# Patient Record
Sex: FEMALE | Race: WHITE | NOT HISPANIC OR LATINO | URBAN - METROPOLITAN AREA
[De-identification: names, ages, dates, MRNs, and addresses within clinical notes are randomized per-mention and may not be internally consistent; named-entity substitution may affect disease eponyms.]

---

## 2022-01-24 ENCOUNTER — TELEPHONE (OUTPATIENT)
Dept: OBSTETRICS AND GYNECOLOGY | Facility: CLINIC | Age: 47
End: 2022-01-24
Payer: COMMERCIAL

## 2022-01-24 NOTE — TELEPHONE ENCOUNTER
----- Message from Kylie Parson sent at 1/24/2022 12:31 PM CST -----  Type:  Sooner Apoointment Request    Caller is requesting a sooner appointment.  Caller declined first available appointment listed below.  Caller will not accept being placed on the waitlist and is requesting a message be sent to doctor.    Name of Caller:  pt  When is the first available appointment?   Symptoms:  unable to schedule  Best Call Back Number:  [phone]  Additional Information:  Annual appt and possible menopause NP

## 2022-02-10 ENCOUNTER — OFFICE VISIT (OUTPATIENT)
Dept: OBSTETRICS AND GYNECOLOGY | Facility: CLINIC | Age: 47
End: 2022-02-10
Payer: COMMERCIAL

## 2022-02-10 VITALS — WEIGHT: 212.5 LBS | DIASTOLIC BLOOD PRESSURE: 76 MMHG | SYSTOLIC BLOOD PRESSURE: 106 MMHG

## 2022-02-10 DIAGNOSIS — N91.2 AMENORRHEA: Primary | ICD-10-CM

## 2022-02-10 DIAGNOSIS — R23.2 HOT FLASHES: ICD-10-CM

## 2022-02-10 PROCEDURE — 99203 PR OFFICE/OUTPT VISIT, NEW, LEVL III, 30-44 MIN: ICD-10-PCS | Mod: S$GLB,,, | Performed by: OBSTETRICS & GYNECOLOGY

## 2022-02-10 PROCEDURE — 1159F MED LIST DOCD IN RCRD: CPT | Mod: CPTII,S$GLB,, | Performed by: OBSTETRICS & GYNECOLOGY

## 2022-02-10 PROCEDURE — 99999 PR PBB SHADOW E&M-EST. PATIENT-LVL IV: CPT | Mod: PBBFAC,,, | Performed by: OBSTETRICS & GYNECOLOGY

## 2022-02-10 PROCEDURE — 3074F PR MOST RECENT SYSTOLIC BLOOD PRESSURE < 130 MM HG: ICD-10-PCS | Mod: CPTII,S$GLB,, | Performed by: OBSTETRICS & GYNECOLOGY

## 2022-02-10 PROCEDURE — 99999 PR PBB SHADOW E&M-EST. PATIENT-LVL IV: ICD-10-PCS | Mod: PBBFAC,,, | Performed by: OBSTETRICS & GYNECOLOGY

## 2022-02-10 PROCEDURE — 3078F DIAST BP <80 MM HG: CPT | Mod: CPTII,S$GLB,, | Performed by: OBSTETRICS & GYNECOLOGY

## 2022-02-10 PROCEDURE — 1160F PR REVIEW ALL MEDS BY PRESCRIBER/CLIN PHARMACIST DOCUMENTED: ICD-10-PCS | Mod: CPTII,S$GLB,, | Performed by: OBSTETRICS & GYNECOLOGY

## 2022-02-10 PROCEDURE — 1159F PR MEDICATION LIST DOCUMENTED IN MEDICAL RECORD: ICD-10-PCS | Mod: CPTII,S$GLB,, | Performed by: OBSTETRICS & GYNECOLOGY

## 2022-02-10 PROCEDURE — 1160F RVW MEDS BY RX/DR IN RCRD: CPT | Mod: CPTII,S$GLB,, | Performed by: OBSTETRICS & GYNECOLOGY

## 2022-02-10 PROCEDURE — 99203 OFFICE O/P NEW LOW 30 MIN: CPT | Mod: S$GLB,,, | Performed by: OBSTETRICS & GYNECOLOGY

## 2022-02-10 PROCEDURE — 3078F PR MOST RECENT DIASTOLIC BLOOD PRESSURE < 80 MM HG: ICD-10-PCS | Mod: CPTII,S$GLB,, | Performed by: OBSTETRICS & GYNECOLOGY

## 2022-02-10 PROCEDURE — 3074F SYST BP LT 130 MM HG: CPT | Mod: CPTII,S$GLB,, | Performed by: OBSTETRICS & GYNECOLOGY

## 2022-02-10 RX ORDER — PROPRANOLOL HYDROCHLORIDE 40 MG/1
TABLET ORAL
COMMUNITY
Start: 2021-12-03 | End: 2024-02-27

## 2022-02-10 RX ORDER — AZELASTINE 1 MG/ML
SPRAY, METERED NASAL
COMMUNITY
Start: 2021-11-30 | End: 2024-02-27

## 2022-02-10 RX ORDER — ERGOCALCIFEROL 1.25 MG/1
CAPSULE ORAL
COMMUNITY
Start: 2021-12-24 | End: 2024-02-27

## 2022-02-10 RX ORDER — NORETHINDRONE 0.35 MG/1
TABLET ORAL
COMMUNITY
Start: 2021-08-25 | End: 2022-02-10 | Stop reason: SDUPTHER

## 2022-02-10 RX ORDER — SUMATRIPTAN SUCCINATE 100 MG/1
100 TABLET ORAL 2 TIMES DAILY PRN
COMMUNITY
Start: 2022-02-01

## 2022-02-10 RX ORDER — NORETHINDRONE 0.35 MG/1
1 TABLET ORAL DAILY
Qty: 28 TABLET | Refills: 11 | Status: SHIPPED | OUTPATIENT
Start: 2022-02-10 | End: 2023-01-24

## 2022-02-10 RX ORDER — ESZOPICLONE 2 MG/1
2 TABLET, FILM COATED ORAL NIGHTLY
COMMUNITY
Start: 2022-01-24 | End: 2024-02-27

## 2022-02-10 RX ORDER — LEVOTHYROXINE SODIUM 88 UG/1
88 TABLET ORAL DAILY
COMMUNITY
Start: 2022-02-01 | End: 2024-02-27

## 2022-02-10 NOTE — PROGRESS NOTES
Chief Complaint   Patient presents with    Well Woman     Pt wants hormone levels checked.        History of Present Illness: Lila Chavira is a 46 y.o. female that presents today 2/10/2022 for   Chief Complaint   Patient presents with    Well Woman     Pt wants hormone levels checked.      She reports no periods for 3-6 months at a time in the last 2 years.  She reports regular periods prior to this was normal.   She reports on nuvaring then now gerardo but off.   She reports breast pain and headache worse on nuvaring.  She reports that she was taking it out and placing new one without leaving out.  Pap done  Within the year.     Past Medical History:   Diagnosis Date    Abnormal Pap smear of cervix     2006    Hypothyroid        Past Surgical History:   Procedure Laterality Date    BASAL CELL CARCINOMA EXCISION      tonsilectomy         Current Outpatient Medications   Medication Sig Dispense Refill    azelastine (ASTELIN) 137 mcg (0.1 %) nasal spray       ergocalciferol (ERGOCALCIFEROL) 50,000 unit Cap       GERARDO 0.35 mg tablet Take 1 tablet (0.35 mg total) by mouth once daily. 28 tablet 11    eszopiclone (LUNESTA) 2 MG Tab Take 2 mg by mouth nightly.      levothyroxine (SYNTHROID) 88 MCG tablet Take 88 mcg by mouth once daily.      propranoloL (INDERAL) 40 MG tablet       sumatriptan (IMITREX) 100 MG tablet Take 100 mg by mouth 2 (two) times daily as needed.       No current facility-administered medications for this visit.       Review of patient's allergies indicates:   Allergen Reactions    Sulfa (sulfonamide antibiotics)        Family History   Problem Relation Age of Onset    Breast cancer Neg Hx     Ovarian cancer Neg Hx      labor Neg Hx        Social History     Tobacco Use    Smoking status: Never Smoker    Smokeless tobacco: Never Used   Substance Use Topics    Alcohol use: Never    Drug use: Never       OB History    Para Term  AB Living   0 0 0 0 0 0   SAB IAB  Ectopic Multiple Live Births   0 0 0 0 0         /76   Wt 96.4 kg (212 lb 8.4 oz)   LMP 10/16/2021       ASSESSMENT/PLAN:  Amenorrhea  -     TSH; Future; Expected date: 02/10/2022  -     SHANICE 0.35 mg tablet; Take 1 tablet (0.35 mg total) by mouth once daily.  Dispense: 28 tablet; Refill: 11  -     Hemoglobin A1C; Future; Expected date: 02/10/2022  -     Prolactin; Future; Expected date: 02/10/2022  -     US Pelvis Comp with Transvag NON-OB (xpd; Future; Expected date: 02/10/2022    Hot flashes  -     Follicle Stimulating Hormone; Future; Expected date: 02/10/2022  -     Luteinizing Hormone; Future; Expected date: 02/10/2022  -     Estradiol; Future; Expected date: 02/10/2022  -     Progesterone; Future; Expected date: 02/10/2022        30 minutes spent today spent preparing reviewing previous external notes, reviewing previous results, performing medical examination, ordering tests or medications, counseling and documenting.

## 2023-03-16 ENCOUNTER — OFFICE VISIT (OUTPATIENT)
Dept: OBSTETRICS AND GYNECOLOGY | Facility: CLINIC | Age: 48
End: 2023-03-16
Payer: COMMERCIAL

## 2023-03-16 VITALS
WEIGHT: 219.56 LBS | DIASTOLIC BLOOD PRESSURE: 70 MMHG | HEIGHT: 65 IN | BODY MASS INDEX: 36.58 KG/M2 | SYSTOLIC BLOOD PRESSURE: 110 MMHG

## 2023-03-16 DIAGNOSIS — Z01.419 ENCOUNTER FOR WELL WOMAN EXAM WITH ROUTINE GYNECOLOGICAL EXAM: Primary | ICD-10-CM

## 2023-03-16 DIAGNOSIS — N91.2 AMENORRHEA: ICD-10-CM

## 2023-03-16 DIAGNOSIS — Z12.31 ENCOUNTER FOR SCREENING MAMMOGRAM FOR BREAST CANCER: ICD-10-CM

## 2023-03-16 PROCEDURE — 3008F PR BODY MASS INDEX (BMI) DOCUMENTED: ICD-10-PCS | Mod: CPTII,S$GLB,, | Performed by: OBSTETRICS & GYNECOLOGY

## 2023-03-16 PROCEDURE — 3078F PR MOST RECENT DIASTOLIC BLOOD PRESSURE < 80 MM HG: ICD-10-PCS | Mod: CPTII,S$GLB,, | Performed by: OBSTETRICS & GYNECOLOGY

## 2023-03-16 PROCEDURE — 87624 HPV HI-RISK TYP POOLED RSLT: CPT | Performed by: OBSTETRICS & GYNECOLOGY

## 2023-03-16 PROCEDURE — 3074F SYST BP LT 130 MM HG: CPT | Mod: CPTII,S$GLB,, | Performed by: OBSTETRICS & GYNECOLOGY

## 2023-03-16 PROCEDURE — 99999 PR PBB SHADOW E&M-EST. PATIENT-LVL III: CPT | Mod: PBBFAC,,, | Performed by: OBSTETRICS & GYNECOLOGY

## 2023-03-16 PROCEDURE — 99999 PR PBB SHADOW E&M-EST. PATIENT-LVL III: ICD-10-PCS | Mod: PBBFAC,,, | Performed by: OBSTETRICS & GYNECOLOGY

## 2023-03-16 PROCEDURE — 1159F MED LIST DOCD IN RCRD: CPT | Mod: CPTII,S$GLB,, | Performed by: OBSTETRICS & GYNECOLOGY

## 2023-03-16 PROCEDURE — 3078F DIAST BP <80 MM HG: CPT | Mod: CPTII,S$GLB,, | Performed by: OBSTETRICS & GYNECOLOGY

## 2023-03-16 PROCEDURE — 88175 CYTOPATH C/V AUTO FLUID REDO: CPT | Performed by: OBSTETRICS & GYNECOLOGY

## 2023-03-16 PROCEDURE — 3008F BODY MASS INDEX DOCD: CPT | Mod: CPTII,S$GLB,, | Performed by: OBSTETRICS & GYNECOLOGY

## 2023-03-16 PROCEDURE — 99396 PR PREVENTIVE VISIT,EST,40-64: ICD-10-PCS | Mod: S$GLB,,, | Performed by: OBSTETRICS & GYNECOLOGY

## 2023-03-16 PROCEDURE — 1159F PR MEDICATION LIST DOCUMENTED IN MEDICAL RECORD: ICD-10-PCS | Mod: CPTII,S$GLB,, | Performed by: OBSTETRICS & GYNECOLOGY

## 2023-03-16 PROCEDURE — 3074F PR MOST RECENT SYSTOLIC BLOOD PRESSURE < 130 MM HG: ICD-10-PCS | Mod: CPTII,S$GLB,, | Performed by: OBSTETRICS & GYNECOLOGY

## 2023-03-16 PROCEDURE — 99396 PREV VISIT EST AGE 40-64: CPT | Mod: S$GLB,,, | Performed by: OBSTETRICS & GYNECOLOGY

## 2023-03-16 RX ORDER — NORETHINDRONE 0.35 MG/1
1 TABLET ORAL DAILY
Qty: 84 TABLET | Refills: 3 | Status: SHIPPED | OUTPATIENT
Start: 2023-03-16 | End: 2024-02-27

## 2023-03-16 NOTE — PROGRESS NOTES
Chief Complaint   Patient presents with    Well Woman    Mammogram       History of Present Illness: Lila Chavira is a 47 y.o. female that presents today 3/16/2023 for well gyn visit.    Past Medical History:   Diagnosis Date    Abnormal Pap smear of cervix     2006    Hypothyroid        Past Surgical History:   Procedure Laterality Date    BASAL CELL CARCINOMA EXCISION      tonsilectomy         Current Outpatient Medications   Medication Sig Dispense Refill    levothyroxine (SYNTHROID) 88 MCG tablet Take 88 mcg by mouth once daily.      propranoloL (INDERAL) 40 MG tablet       sumatriptan (IMITREX) 100 MG tablet Take 100 mg by mouth 2 (two) times daily as needed.      azelastine (ASTELIN) 137 mcg (0.1 %) nasal spray       ergocalciferol (ERGOCALCIFEROL) 50,000 unit Cap       SHANICE 0.35 mg tablet Take 1 tablet (0.35 mg total) by mouth once daily. 84 tablet 3    eszopiclone (LUNESTA) 2 MG Tab Take 2 mg by mouth nightly.       No current facility-administered medications for this visit.       Review of patient's allergies indicates:   Allergen Reactions    Sulfa (sulfonamide antibiotics)        Family History   Problem Relation Age of Onset    Breast cancer Neg Hx     Ovarian cancer Neg Hx      labor Neg Hx        Social History     Socioeconomic History    Marital status: Single   Tobacco Use    Smoking status: Never    Smokeless tobacco: Never   Substance and Sexual Activity    Alcohol use: Never    Drug use: Never    Sexual activity: Not Currently       OB History    Para Term  AB Living   0 0 0 0 0 0   SAB IAB Ectopic Multiple Live Births   0 0 0 0 0       Review of Symptoms:  GENERAL: Denies weight gain or weight loss. Feeling well overall.   SKIN: Denies rash or lesions.   HEAD: Denies head injury or headache.   NODES: Denies enlarged lymph nodes.   CHEST: Denies chest pain or shortness of breath.   CARDIOVASCULAR: Denies palpitations or left sided chest pain.   ABDOMEN: No abdominal pain,  "constipation, diarrhea, nausea, vomiting or rectal bleeding.   URINARY: No frequency, dysuria, hematuria, or burning on urination.  HEMATOLOGIC: No easy bruisability or excessive bleeding.   MUSCULOSKELETAL: Denies joint pain or swelling.     /70   Ht 5' 5" (1.651 m)   Wt 99.6 kg (219 lb 9.3 oz)   LMP  (LMP Unknown)   Physical Exam:  APPEARANCE: Well nourished, well developed, in no acute distress.  SKIN: Normal skin turgor, no lesions.  NECK: Neck symmetric without masses   RESPIRATORY: Normal respiratory effort with no retractions or use of accessory muscles  CARDIOVASCULAR: Peripheral vascular system with no swelling no varicosities and palpation of pulses normal  LYMPHATIC: No enlargements of the lymph nodes noted in the neck, axillae, or groin  ABDOMEN: Soft. No tenderness or masses. No hepatosplenomegaly. No hernias.  BREASTS: Symmetrical, no skin changes or visible lesions. No palpable masses, nipple discharge or adenopathy bilaterally.  PELVIC: Normal external female genitalia without lesions. Normal hair distribution. Adequate perineal body, normal urethral meatus. Urethra with no masses.  Bladder nontender. Vagina moist and well rugated without lesions or discharge. Cervix pink and without lesions. No significant cystocele or rectocele. Bimanual exam showed uterus normal size, shape, position, mobile and nontender. Adnexa without masses or tenderness. Urethra and bladder normal.   EXTREMITIES: No clubbing cyanosis or edema.    ASSESSMENT/PLAN:  Encounter for well woman exam with routine gynecological exam  -     Liquid-Based Pap Smear, Screening  -     HPV High Risk Genotypes, PCR    Encounter for screening mammogram for breast cancer  -     Mammo Digital Screening Kaela w/ [facility]; Future; Expected date: 03/16/2023    Amenorrhea  -     SHANICE 0.35 mg tablet; Take 1 tablet (0.35 mg total) by mouth once daily.  Dispense: 84 tablet; Refill: 3          Patient was counseled today on Pelvic exams and Pap " Smear guidelines.   We discussed STD screening if at high risk for a STD.  We discussed recommendation for breast cancer screening with mammogram every other year after the age of 40 and annually after the age of 50.    We discussed colon cancer screening when indicated.   Osteoporosis screening discussed when indicated.   She was advised to see her primary care physician for all other health maintenance.     FOLLOW-UP with me for next routine visit.

## 2023-03-22 ENCOUNTER — PATIENT MESSAGE (OUTPATIENT)
Dept: OBSTETRICS AND GYNECOLOGY | Facility: CLINIC | Age: 48
End: 2023-03-22
Payer: COMMERCIAL

## 2023-03-22 ENCOUNTER — TELEPHONE (OUTPATIENT)
Dept: RADIOLOGY | Facility: HOSPITAL | Age: 48
End: 2023-03-22
Payer: COMMERCIAL

## 2023-03-22 LAB
HPV HR 12 DNA SPEC QL NAA+PROBE: NEGATIVE
HPV16 AG SPEC QL: NEGATIVE
HPV18 DNA SPEC QL NAA+PROBE: NEGATIVE

## 2023-03-22 NOTE — TELEPHONE ENCOUNTER
Patient will have a 6 month follow up mammogram at [facility] from mammogram done at [facility].

## 2023-03-23 LAB
FINAL PATHOLOGIC DIAGNOSIS: NORMAL
Lab: NORMAL

## 2023-03-24 ENCOUNTER — TELEPHONE (OUTPATIENT)
Dept: OBSTETRICS AND GYNECOLOGY | Facility: CLINIC | Age: 48
End: 2023-03-24
Payer: COMMERCIAL

## 2023-03-24 NOTE — TELEPHONE ENCOUNTER
Stereostactic biopsy versus 6 months follow-up     She discussed with radiology and desires to wait for 6 months.

## 2023-09-18 ENCOUNTER — PATIENT MESSAGE (OUTPATIENT)
Dept: OBSTETRICS AND GYNECOLOGY | Facility: CLINIC | Age: 48
End: 2023-09-18
Payer: COMMERCIAL

## 2023-09-19 ENCOUNTER — TELEPHONE (OUTPATIENT)
Dept: OBSTETRICS AND GYNECOLOGY | Facility: CLINIC | Age: 48
End: 2023-09-19
Payer: COMMERCIAL

## 2023-09-19 NOTE — TELEPHONE ENCOUNTER
"Due 9/22    Left diagnostic mmg and USG     "Patient will have a 6 month follow up mammogram at [facility] from mammogram done at [facility]."  "

## 2024-02-27 ENCOUNTER — OFFICE VISIT (OUTPATIENT)
Dept: PRIMARY CARE CLINIC | Facility: CLINIC | Age: 49
End: 2024-02-27
Payer: COMMERCIAL

## 2024-02-27 VITALS
TEMPERATURE: 97 F | HEIGHT: 65 IN | HEART RATE: 52 BPM | SYSTOLIC BLOOD PRESSURE: 98 MMHG | DIASTOLIC BLOOD PRESSURE: 64 MMHG | WEIGHT: 215.63 LBS | OXYGEN SATURATION: 100 % | BODY MASS INDEX: 35.93 KG/M2

## 2024-02-27 DIAGNOSIS — R63.5 WEIGHT GAIN: ICD-10-CM

## 2024-02-27 DIAGNOSIS — N95.1 MENOPAUSAL SYMPTOMS: ICD-10-CM

## 2024-02-27 DIAGNOSIS — G90.1 DYSAUTONOMIA: ICD-10-CM

## 2024-02-27 DIAGNOSIS — E21.5 PARATHYROID DISORDER: ICD-10-CM

## 2024-02-27 DIAGNOSIS — E03.9 HYPOTHYROIDISM, UNSPECIFIED TYPE: Primary | ICD-10-CM

## 2024-02-27 DIAGNOSIS — R45.89 ANXIETY ABOUT HEALTH: ICD-10-CM

## 2024-02-27 DIAGNOSIS — E78.2 MIXED HYPERLIPIDEMIA: ICD-10-CM

## 2024-02-27 PROCEDURE — 99999 PR PBB SHADOW E&M-EST. PATIENT-LVL III: CPT | Mod: PBBFAC,,, | Performed by: FAMILY MEDICINE

## 2024-02-27 PROCEDURE — 1159F MED LIST DOCD IN RCRD: CPT | Mod: CPTII,S$GLB,, | Performed by: FAMILY MEDICINE

## 2024-02-27 PROCEDURE — 3008F BODY MASS INDEX DOCD: CPT | Mod: CPTII,S$GLB,, | Performed by: FAMILY MEDICINE

## 2024-02-27 PROCEDURE — 3078F DIAST BP <80 MM HG: CPT | Mod: CPTII,S$GLB,, | Performed by: FAMILY MEDICINE

## 2024-02-27 PROCEDURE — 3074F SYST BP LT 130 MM HG: CPT | Mod: CPTII,S$GLB,, | Performed by: FAMILY MEDICINE

## 2024-02-27 PROCEDURE — 99205 OFFICE O/P NEW HI 60 MIN: CPT | Mod: S$GLB,,, | Performed by: FAMILY MEDICINE

## 2024-02-27 RX ORDER — PROPRANOLOL HYDROCHLORIDE 60 MG/1
60 CAPSULE, EXTENDED RELEASE ORAL DAILY
COMMUNITY
Start: 2023-12-27

## 2024-02-27 NOTE — PROGRESS NOTES
"    [facility]       [address] [place] [address]      [phone]     Tatyana Lubin MD         .      Office Visit  02/27/2024  [id number]      SUBJECTIVE     HPI:  Lila Chavira is a 48 y.o. female presents today in clinic for "Establish Care and Thyroid Problem  ."   Patient is new patient to me here to establish care.  Main concern is with thyroid.  She was diagnosed with hypothyroidism over 20 years ago shortly after being seen by dysautonomia specialists in diagnosed with POTS syndrome.  Her primary care has since then resumed care of her thyroid, but she feels like her levels have been up and down and does not feel like it has been well-controlled.  As of November of last year, she stopped taking all thyroid medications waiting to get into this appointment, and wants to get them looked at again.  Recent thyroid ultrasound done by her dysautonomia specialists, was done a few months ago which showed relatively normal thyroid, but to questionable parathyroid abnormalities.  She does not recall a history of elevated calcium levels.  She does have a history of low vitamin-D but is taking a supplement given to her by her cardiologist that apparently has vitamin-D in it.  She is also concerned about weight.  She is never tried getting pregnant does not have any children.  She has not had a cycle in over a year and a half, and labs do support presence of menopause.  She just simply wants to feel better, but has not started or even discuss hormone replacement therapy with anybody.  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  Her cycles were relatively okay and actually was more normal prior to menopause than it had been previously.  No one ever diagnosed her with PCOS.    Thyroid Problem        ROS   Review of symptoms are negative except as noted.     PAST MEDICAL HISTORY " "    Past Medical History:   Diagnosis Date    Abnormal Pap smear of cervix     2006    Dysautonomia     Hypothyroid     Sleep apnea, unspecified        Past Surgical History:   Procedure Laterality Date    ADENOIDECTOMY      BASAL CELL CARCINOMA EXCISION      REDUCTION OF BOTH BREASTS Bilateral     tonsilectomy      TONSILLECTOMY         Social History     Socioeconomic History    Marital status: Single   Tobacco Use    Smoking status: Never    Smokeless tobacco: Never   Substance and Sexual Activity    Alcohol use: Never    Drug use: Never    Sexual activity: Not Currently       Allergies as of 02/27/2024 - Reviewed 02/27/2024   Allergen Reaction Noted    Sulfa (sulfonamide antibiotics)  02/10/2022       HOME MEDS     Current Outpatient Medications   Medication Instructions    propranoloL (INDERAL [place]) 60 mg, Oral, Daily    sumatriptan (IMITREX) 100 mg, Oral, 2 times daily PRN       The following were updated and reviewed by myself in the chart: medications, past medical history, past surgical history, family history, social history, and allergies.        Objective    OBJECTIVE   BP 98/64   Pulse (!) 52   Temp 97.4 °F (36.3 °C)   Ht 5' 5" (1.651 m)   Wt 97.8 kg (215 lb 9.8 oz)   LMP  (Within Years)   SpO2 100%   BMI 35.88 kg/m²     Wt Readings from Last 2 Encounters:   02/27/24 97.8 kg (215 lb 9.8 oz)   03/16/23 99.6 kg (219 lb 9.3 oz)       BP Readings from Last 2 Encounters:   02/27/24 98/64   03/16/23 110/70          Physical Exam  Vitals and nursing note reviewed.   Constitutional:       Appearance: Normal appearance. She is obese.   HENT:      Head: Normocephalic and atraumatic.      Nose: Nose normal.   Eyes:      Extraocular Movements: Extraocular movements intact.      Conjunctiva/sclera: Conjunctivae normal.      Pupils: Pupils are equal, round, and reactive to light.   Neck:      Comments: Increased neck circumference  Cardiovascular:      Rate and Rhythm: Normal rate and regular rhythm. "   Pulmonary:      Effort: Pulmonary effort is normal.      Breath sounds: Normal breath sounds.   Abdominal:      General: Abdomen is flat.      Palpations: Abdomen is soft.      Tenderness: There is no abdominal tenderness.      Comments: Increased abdominal girth   Musculoskeletal:         General: No swelling or tenderness. Normal range of motion.      Cervical back: Normal range of motion.   Lymphadenopathy:      Cervical: No cervical adenopathy.   Skin:     General: Skin is warm.      Coloration: Skin is pale.      Findings: No lesion or rash.   Neurological:      General: No focal deficit present.      Mental Status: She is alert. Mental status is at baseline.   Psychiatric:         Mood and Affect: Mood normal.         Behavior: Behavior normal.               LABS      LAB RESULTS, IF AVAILABLE, ARE DISCUSSED WITH PATIENT AND POSTED TO PATIENT PORTAL     ASSESSMENT & PLAN     1. Hypothyroidism, unspecified type  -     Cancel: TSH; Future; Expected date: 02/27/2024  -     Cancel: Thyroid Peroxidase Antibody; Future; Expected date: 02/27/2024  -     Cancel: T4, Free; Future; Expected date: 02/27/2024  -     Cancel: T3, Free; Future; Expected date: 02/27/2024  -     Cancel: Anti-Thyroglobulin Antibody; Future; Expected date: 02/27/2024  -     CBC Without Differential; Future; Expected date: 02/27/2024  -     Comprehensive Metabolic Panel; Future; Expected date: 02/27/2024  -     Lipid Panel; Future; Expected date: 02/27/2024  -     DHEA-Sulfate; Future; Expected date: 02/27/2024  -     Estrone; Future; Expected date: 02/27/2024  -     TESTOSTERONE, FREE (DIALYSIS) AND TOTAL, LC/MS/MS; Future; Expected date: 02/27/2024  -     Hemoglobin A1C; Future; Expected date: 02/27/2024  -     Insulin, Random; Future; Expected date: 02/27/2024  -     TSH; Future; Expected date: 02/27/2024  -     Thyroid Peroxidase Antibody; Future; Expected date: 02/27/2024  -     T4, Free; Future; Expected date: 02/27/2024  -     T3, Free;  Future; Expected date: 02/27/2024  -     Anti-Thyroglobulin Antibody; Future; Expected date: 02/27/2024  -     Vitamin D 25-Hydroxy; Future; Expected date: 02/27/2024  -     PTH, Intact; Future; Expected date: 02/27/2024  -     CALCIUM, IONIZED; Future; Expected date: 02/27/2024  Has not taking any medication in three months, she has had some low thyroid symptoms, but overall very mi. we will get full thyroid panel and then reassess need for medication.  2. Dysautonomia  Defer to Dr. Scruggs, currently on beta-blocker  3. BMI 35.0-35.9,adult  -     CBC Without Differential; Future; Expected date: 02/27/2024  -     Comprehensive Metabolic Panel; Future; Expected date: 02/27/2024  -     Lipid Panel; Future; Expected date: 02/27/2024  -     DHEA-Sulfate; Future; Expected date: 02/27/2024  -     Estrone; Future; Expected date: 02/27/2024  -     TESTOSTERONE, FREE (DIALYSIS) AND TOTAL, LC/MS/MS; Future; Expected date: 02/27/2024  -     Hemoglobin A1C; Future; Expected date: 02/27/2024  -     Insulin, Random; Future; Expected date: 02/27/2024  -     TSH; Future; Expected date: 02/27/2024  -     Thyroid Peroxidase Antibody; Future; Expected date: 02/27/2024  -     T4, Free; Future; Expected date: 02/27/2024  -     T3, Free; Future; Expected date: 02/27/2024  -     Anti-Thyroglobulin Antibody; Future; Expected date: 02/27/2024  -     Vitamin D 25-Hydroxy; Future; Expected date: 02/27/2024  -     PTH, Intact; Future; Expected date: 02/27/2024  -     CALCIUM, IONIZED; Future; Expected date: 02/27/2024  We will get labs mainly looking at insulin and thyroid.  4. Weight gain  -     Cancel: CBC Without Differential; Future; Expected date: 02/27/2024  -     Cancel: Comprehensive Metabolic Panel; Future; Expected date: 02/27/2024  -     Cancel: Hemoglobin A1C; Future; Expected date: 02/27/2024  -     Cancel: Insulin, Random; Future; Expected date: 02/27/2024  -     CBC Without Differential; Future; Expected date: 02/27/2024  -      Comprehensive Metabolic Panel; Future; Expected date: 02/27/2024  -     Lipid Panel; Future; Expected date: 02/27/2024  -     DHEA-Sulfate; Future; Expected date: 02/27/2024  -     Estrone; Future; Expected date: 02/27/2024  -     TESTOSTERONE, FREE (DIALYSIS) AND TOTAL, LC/MS/MS; Future; Expected date: 02/27/2024  -     Hemoglobin A1C; Future; Expected date: 02/27/2024  -     Insulin, Random; Future; Expected date: 02/27/2024  -     TSH; Future; Expected date: 02/27/2024  -     Thyroid Peroxidase Antibody; Future; Expected date: 02/27/2024  -     T4, Free; Future; Expected date: 02/27/2024  -     T3, Free; Future; Expected date: 02/27/2024  -     Anti-Thyroglobulin Antibody; Future; Expected date: 02/27/2024  -     Vitamin D 25-Hydroxy; Future; Expected date: 02/27/2024  -     PTH, Intact; Future; Expected date: 02/27/2024  -     CALCIUM, IONIZED; Future; Expected date: 02/27/2024    5. Anxiety about health  Increasingly becoming worse as she is gotten older, not taking any medication.  6. Mixed hyperlipidemia  -     Cancel: Lipid Panel; Future; Expected date: 02/27/2024  -     CBC Without Differential; Future; Expected date: 02/27/2024  -     Comprehensive Metabolic Panel; Future; Expected date: 02/27/2024  -     Lipid Panel; Future; Expected date: 02/27/2024  -     DHEA-Sulfate; Future; Expected date: 02/27/2024  -     Estrone; Future; Expected date: 02/27/2024  -     TESTOSTERONE, FREE (DIALYSIS) AND TOTAL, LC/MS/MS; Future; Expected date: 02/27/2024  -     Hemoglobin A1C; Future; Expected date: 02/27/2024  -     Insulin, Random; Future; Expected date: 02/27/2024  -     TSH; Future; Expected date: 02/27/2024  -     Thyroid Peroxidase Antibody; Future; Expected date: 02/27/2024  -     T4, Free; Future; Expected date: 02/27/2024  -     T3, Free; Future; Expected date: 02/27/2024  -     Anti-Thyroglobulin Antibody; Future; Expected date: 02/27/2024  -     Vitamin D 25-Hydroxy; Future; Expected date: 02/27/2024  -      PTH, Intact; Future; Expected date: 02/27/2024  -     CALCIUM, IONIZED; Future; Expected date: 02/27/2024  LDL was previously elevated, not taking any medication.  7. Parathyroid disorder  -     Cancel: PTH, Intact; Future; Expected date: 02/27/2024  -     Cancel: CALCIUM, IONIZED; Future; Expected date: 02/27/2024  -     Cancel: Vitamin D 25-Hydroxy; Future; Expected date: 02/27/2024  -     CBC Without Differential; Future; Expected date: 02/27/2024  -     Comprehensive Metabolic Panel; Future; Expected date: 02/27/2024  -     Lipid Panel; Future; Expected date: 02/27/2024  -     DHEA-Sulfate; Future; Expected date: 02/27/2024  -     Estrone; Future; Expected date: 02/27/2024  -     TESTOSTERONE, FREE (DIALYSIS) AND TOTAL, LC/MS/MS; Future; Expected date: 02/27/2024  -     Hemoglobin A1C; Future; Expected date: 02/27/2024  -     Insulin, Random; Future; Expected date: 02/27/2024  -     TSH; Future; Expected date: 02/27/2024  -     Thyroid Peroxidase Antibody; Future; Expected date: 02/27/2024  -     T4, Free; Future; Expected date: 02/27/2024  -     T3, Free; Future; Expected date: 02/27/2024  -     Anti-Thyroglobulin Antibody; Future; Expected date: 02/27/2024  -     Vitamin D 25-Hydroxy; Future; Expected date: 02/27/2024  -     PTH, Intact; Future; Expected date: 02/27/2024  -     CALCIUM, IONIZED; Future; Expected date: 02/27/2024  Incidental findings found on thyroid ultrasound a few months ago.  We will get full parathyroid panel  8. Menopausal symptoms  -     Cancel: DHEA-Sulfate; Future; Expected date: 02/27/2024  -     Cancel: Estrone; Future; Expected date: 02/27/2024  -     Cancel: TESTOSTERONE, FREE (DIALYSIS) AND TOTAL, LC/MS/MS; Future; Expected date: 02/27/2024  -     CBC Without Differential; Future; Expected date: 02/27/2024  -     Comprehensive Metabolic Panel; Future; Expected date: 02/27/2024  -     Lipid Panel; Future; Expected date: 02/27/2024  -     DHEA-Sulfate; Future; Expected date:  "02/27/2024  -     Estrone; Future; Expected date: 02/27/2024  -     TESTOSTERONE, FREE (DIALYSIS) AND TOTAL, LC/MS/MS; Future; Expected date: 02/27/2024  -     Hemoglobin A1C; Future; Expected date: 02/27/2024  -     Insulin, Random; Future; Expected date: 02/27/2024  -     TSH; Future; Expected date: 02/27/2024  -     Thyroid Peroxidase Antibody; Future; Expected date: 02/27/2024  -     T4, Free; Future; Expected date: 02/27/2024  -     T3, Free; Future; Expected date: 02/27/2024  -     Anti-Thyroglobulin Antibody; Future; Expected date: 02/27/2024  -     Vitamin D 25-Hydroxy; Future; Expected date: 02/27/2024  -     PTH, Intact; Future; Expected date: 02/27/2024  -     CALCIUM, IONIZED; Future; Expected date: 02/27/2024  Question if patient would benefit from hormone replacement therapy, we will get some of the postmenopausal hormone labs and then once the labs come back have a better assessment of what we can do to help patient feel better.        I spent a total of 60 minutes face to face and non-face to face on the date of this visit.This includes time preparing to see the patient (eg, review of tests, notes), obtaining and/or reviewing additional history from an independent historian and/or outside medical records, documenting clinical information in the electronic health record, independently interpreting results and/or communicating results to the patient/family/caregiver, or care coordinator.      Disclaimer: Portions of this record may have been created with voice recognition software. Occasional wrong-word or "sound-a-like" substitutions may have occurred due to inherent limitations of voice recognition software. Read the chart carefully and recognize, using context, where substitutions have occurred."    Signed by:  Tatyana Lubin MD           "

## 2024-03-12 ENCOUNTER — OFFICE VISIT (OUTPATIENT)
Dept: PRIMARY CARE CLINIC | Facility: CLINIC | Age: 49
End: 2024-03-12
Payer: COMMERCIAL

## 2024-03-12 VITALS
BODY MASS INDEX: 36.26 KG/M2 | HEART RATE: 58 BPM | WEIGHT: 217.63 LBS | TEMPERATURE: 98 F | SYSTOLIC BLOOD PRESSURE: 100 MMHG | DIASTOLIC BLOOD PRESSURE: 70 MMHG | OXYGEN SATURATION: 98 % | HEIGHT: 65 IN

## 2024-03-12 DIAGNOSIS — E03.9 HYPOTHYROIDISM, UNSPECIFIED TYPE: ICD-10-CM

## 2024-03-12 PROCEDURE — 99214 OFFICE O/P EST MOD 30 MIN: CPT | Mod: S$GLB,,, | Performed by: FAMILY MEDICINE

## 2024-03-12 PROCEDURE — 1159F MED LIST DOCD IN RCRD: CPT | Mod: CPTII,S$GLB,, | Performed by: FAMILY MEDICINE

## 2024-03-12 PROCEDURE — 3008F BODY MASS INDEX DOCD: CPT | Mod: CPTII,S$GLB,, | Performed by: FAMILY MEDICINE

## 2024-03-12 PROCEDURE — 99999 PR PBB SHADOW E&M-EST. PATIENT-LVL III: CPT | Mod: PBBFAC,,, | Performed by: FAMILY MEDICINE

## 2024-03-12 PROCEDURE — 3078F DIAST BP <80 MM HG: CPT | Mod: CPTII,S$GLB,, | Performed by: FAMILY MEDICINE

## 2024-03-12 PROCEDURE — 3074F SYST BP LT 130 MM HG: CPT | Mod: CPTII,S$GLB,, | Performed by: FAMILY MEDICINE

## 2024-03-12 RX ORDER — TIRZEPATIDE 2.5 MG/.5ML
2.5 INJECTION, SOLUTION SUBCUTANEOUS
Qty: 4 PEN | Refills: 5 | Status: SHIPPED | OUTPATIENT
Start: 2024-03-12 | End: 2024-03-22

## 2024-03-12 RX ORDER — THYROID 90 MG/1
90 TABLET ORAL
Qty: 30 TABLET | Refills: 5 | Status: SHIPPED | OUTPATIENT
Start: 2024-03-12 | End: 2024-03-13 | Stop reason: SDUPTHER

## 2024-03-12 NOTE — PROGRESS NOTES
"    [facility]       [address] [place] [address]      [phone]     Tatyana Lubin MD         .      Office Visit  03/12/2024  [id number]      SUBJECTIVE     HPI:  Lila Chavira is a 48 y.o. female presents today in clinic for "Follow-up  ."   Patient is here for new patient lab fu- is wanting to feel better and work on weight loss.  Patient has been off thyroid medication for a few months because she could not really see where it was helping with her symptoms, and her lab values would very so much from year to year so she just question if she really needed it.  Recall she has a history of radiation exposure which she thinks caused a destruction of her thyroid function.  Despite dieting and exercise, she has not been able to lose weight.  She is tried different diet types, all with no success.  She is looking for any alternative besides diet and exercise that can help her with the weight loss.    Follow-up        ROS   Review of symptoms are negative except as noted.     PAST MEDICAL HISTORY     Past Medical History:   Diagnosis Date    Abnormal Pap smear of cervix     2006    Dysautonomia     Hypothyroid     Sleep apnea, unspecified        Past Surgical History:   Procedure Laterality Date    ADENOIDECTOMY      BASAL CELL CARCINOMA EXCISION      REDUCTION OF BOTH BREASTS Bilateral     tonsilectomy      TONSILLECTOMY         Social History     Socioeconomic History    Marital status: Single   Tobacco Use    Smoking status: Never    Smokeless tobacco: Never   Substance and Sexual Activity    Alcohol use: Never    Drug use: Never    Sexual activity: Not Currently       Allergies as of 03/12/2024 - Reviewed 03/12/2024   Allergen Reaction Noted    Sulfa (sulfonamide antibiotics)  02/10/2022       HOME MEDS     Current Outpatient Medications   Medication Instructions    propranoloL (INDERAL LA) 60 mg, Oral, Daily    sumatriptan (IMITREX) 100 mg, " "Oral, 2 times daily PRN    thyroid (pork) (NP THYROID) 90 mg, Oral, Before breakfast    ZEPBOUND 2.5 mg, Subcutaneous, Every 7 days       The following were updated and reviewed by myself in the chart: medications, past medical history, past surgical history, family history, social history, and allergies.        Objective    OBJECTIVE   /70   Pulse (!) 58   Temp 97.7 °F (36.5 °C)   Ht 5' 5" (1.651 m)   Wt 98.7 kg (217 lb 9.5 oz)   LMP  (Within Years)   SpO2 98%   BMI 36.21 kg/m²     Wt Readings from Last 2 Encounters:   03/12/24 98.7 kg (217 lb 9.5 oz)   02/27/24 97.8 kg (215 lb 9.8 oz)       BP Readings from Last 2 Encounters:   03/12/24 100/70   02/27/24 98/64          Physical Exam  Vitals and nursing note reviewed.   Constitutional:       Appearance: Normal appearance. She is obese.   HENT:      Head: Normocephalic and atraumatic.      Nose: Nose normal.   Eyes:      Extraocular Movements: Extraocular movements intact.      Conjunctiva/sclera: Conjunctivae normal.      Pupils: Pupils are equal, round, and reactive to light.   Neck:      Comments: Increased neck circumference  Cardiovascular:      Rate and Rhythm: Normal rate and regular rhythm.   Pulmonary:      Effort: Pulmonary effort is normal.      Breath sounds: Normal breath sounds.   Abdominal:      General: Abdomen is flat.      Palpations: Abdomen is soft.      Tenderness: There is no abdominal tenderness.      Comments: Increased abdominal girth   Musculoskeletal:         General: No swelling or tenderness. Normal range of motion.      Cervical back: Normal range of motion.   Lymphadenopathy:      Cervical: No cervical adenopathy.   Skin:     General: Skin is warm.      Coloration: Skin is pale.      Findings: No lesion or rash.   Neurological:      General: No focal deficit present.      Mental Status: She is alert. Mental status is at baseline.   Psychiatric:         Mood and Affect: Mood normal.         Behavior: Behavior normal. "               LABS      LAB RESULTS, IF AVAILABLE, ARE DISCUSSED WITH PATIENT AND POSTED TO PATIENT PORTAL     ASSESSMENT & PLAN     1. BMI 35.0-35.9,adult  -     tirzepatide, weight loss, (ZEPBOUND) 2.5 mg/0.5 mL PnIj; Inject 2.5 mg into the skin every 7 days.  Dispense: 4 Pen; Refill: 5  Will try zepbound and consider compound if not covered- will work on diet and exercise more maybe even perhaps consider intermittent fasting.  There are multiple etiologies of weight gain. A  weight loss plan that focuses on diet, exercise and good healthy lifestyle changes are a necessity to help combat obesity. Medication and/orsurgical options are available;  unfortunately,  many options may not be approved by insurance nor FDA approved for obesity but could be very beneficial. There are possible risks associated with therapeutic options and patient should notify us of any concerns. Patients should adhere to plan and follow up routinely as directed.   2. Hypothyroidism, unspecified type  -     thyroid, pork, (NP THYROID) 90 mg Tab; Take 1 tablet (90 mg total) by mouth before breakfast.  Dispense: 30 tablet; Refill: 5  -     T3, Free; Future; Expected date: 03/12/2024  -     T4, Free; Future; Expected date: 03/12/2024  -     TSH; Future; Expected date: 03/12/2024  Tsh elevated- ? Radiation effect- will restart meds but will try np thyroid to see if she feels better on this regimen as she never seen much benefit on synthroid generic        I spent a total of 35 minutes face to face and non-face to face on the date of this visit.This includes time preparing to see the patient (eg, review of tests, notes), obtaining and/or reviewing additional history from an independent historian and/or outside medical records, documenting clinical information in the electronic health record, independently interpreting results and/or communicating results to the patient/family/caregiver, or care coordinator.      Disclaimer: Portions of this  "record may have been created with voice recognition software. Occasional wrong-word or "sound-a-like" substitutions may have occurred due to inherent limitations of voice recognition software. Read the chart carefully and recognize, using context, where substitutions have occurred."    Signed by:  Tatyana Lubin MD           "

## 2024-03-13 ENCOUNTER — PATIENT MESSAGE (OUTPATIENT)
Dept: PRIMARY CARE CLINIC | Facility: CLINIC | Age: 49
End: 2024-03-13
Payer: COMMERCIAL

## 2024-03-13 DIAGNOSIS — E03.9 HYPOTHYROIDISM, UNSPECIFIED TYPE: ICD-10-CM

## 2024-03-14 RX ORDER — THYROID 90 MG/1
90 TABLET ORAL
Qty: 30 TABLET | Refills: 5 | Status: SHIPPED | OUTPATIENT
Start: 2024-03-14

## 2024-03-22 ENCOUNTER — PATIENT MESSAGE (OUTPATIENT)
Dept: PRIMARY CARE CLINIC | Facility: CLINIC | Age: 49
End: 2024-03-22
Payer: COMMERCIAL

## 2024-03-22 DIAGNOSIS — G90.1 DYSAUTONOMIA: Primary | ICD-10-CM

## 2024-06-04 ENCOUNTER — PATIENT MESSAGE (OUTPATIENT)
Dept: FAMILY MEDICINE | Facility: CLINIC | Age: 49
End: 2024-06-04
Payer: COMMERCIAL